# Patient Record
Sex: MALE | Race: WHITE | Employment: FULL TIME | URBAN - METROPOLITAN AREA
[De-identification: names, ages, dates, MRNs, and addresses within clinical notes are randomized per-mention and may not be internally consistent; named-entity substitution may affect disease eponyms.]

---

## 2017-04-07 ENCOUNTER — OFFICE VISIT (OUTPATIENT)
Dept: INTERNAL MEDICINE CLINIC | Age: 42
End: 2017-04-07

## 2017-04-07 VITALS
HEART RATE: 85 BPM | WEIGHT: 177.1 LBS | BODY MASS INDEX: 25.35 KG/M2 | HEIGHT: 70 IN | SYSTOLIC BLOOD PRESSURE: 120 MMHG | OXYGEN SATURATION: 97 % | TEMPERATURE: 97.5 F | RESPIRATION RATE: 16 BRPM | DIASTOLIC BLOOD PRESSURE: 70 MMHG

## 2017-04-07 DIAGNOSIS — F43.22 ADJUSTMENT DISORDER WITH ANXIETY: Primary | ICD-10-CM

## 2017-04-07 RX ORDER — CLONAZEPAM 1 MG/1
1 TABLET ORAL
Qty: 30 TAB | Refills: 2 | Status: SHIPPED | OUTPATIENT
Start: 2017-04-07 | End: 2017-07-24 | Stop reason: SDUPTHER

## 2017-04-07 NOTE — MR AVS SNAPSHOT
Visit Information Date & Time Provider Department Dept. Phone Encounter #  
 4/7/2017  8:20 AM MD [facility] [phone] [id number] Follow-up Instructions Return in about 3 months (around 7/7/2017) for Schedule Visit with Dr. Marixa Suarez / formally establish care. Upcoming Health Maintenance Date Due INFLUENZA AGE 9 TO ADULT 8/1/2016 DTaP/Tdap/Td series (2 - Td) 7/10/2022 Allergies as of 4/7/2017  Review Complete On: 4/7/2017 By: [id number], [place]  
 No Known Allergies Current Immunizations  Reviewed on 8/10/2012 Name Date Hepatitis B Vaccine 8/10/2012, 7/10/2012 TDAP Vaccine 7/10/2012 Not reviewed this visit You Were Diagnosed With   
  
 Codes Comments Adjustment disorder with anxiety    -  Primary ICD-10-CM: P01.92 
ICD-9-CM: 309.24 Vitals BP Pulse Temp Resp Height(growth percentile) Weight(growth percentile) 120/70 (BP 1 Location: Left arm, BP Patient Position: Sitting) 85 97.5 °F (36.4 °C) (Oral) 16 5' 10\" (1.778 m) 177 lb 1.6 oz (80.3 kg) SpO2 BMI Smoking Status 97% 25.41 kg/m2 Former Smoker Vitals History BMI and BSA Data Body Mass Index Body Surface Area  
 25.41 kg/m 2 1.99 m 2 Preferred Pharmacy Pharmacy Name Phone [facility] [address]. [phone] Your Updated Medication List  
  
   
This list is accurate as of: 4/7/17  8:53 AM.  Always use your most recent med list.  
  
  
  
  
 clonazePAM 1 mg tablet Commonly known as:  Stephen Alice Take 1 Tab by mouth nightly as needed. Prescriptions Printed Refills  
 clonazePAM (KLONOPIN) 1 mg tablet 2 Sig: Take 1 Tab by mouth nightly as needed. Class: Print Route: Oral  
  
Follow-up Instructions Return in about 3 months (around 7/7/2017) for Schedule Visit with Dr. Marixa Suarez / henok establish care. Patient Instructions Adjustment Disorder: Care Instructions Your Care Instructions Adjustment disorder means that you have emotional or behavioral problems because of stress. But your response to the stress is far more severe than a normal response. It is severe enough to affect your work or social life and may cause depression and physical pains and problems. Events that may cause this response can include a divorce, money problems, or starting school or a new job. It might be anything that causes some stress. This disorder is most often a short-term problem. It happens within 3 months of the stressful event or change. If the response lasts longer than 6 months after the event ends, you may have a more serious disorder. Follow-up care is a key part of your treatment and safety. Be sure to make and go to all appointments, and call your doctor if you are having problems. It's also a good idea to know your test results and keep a list of the medicines you take. How can you care for yourself at home? · Go to all counseling sessions. Do not skip any because you are feeling better. · If your doctor prescribed medicines, take them exactly as prescribed. Call your doctor if you think you are having a problem with your medicine. You will get more details on the specific medicines your doctor prescribes. · Discuss the causes of your stress with a good friend or family member. Or you can join a support group for people with similar problems. Talking to others sometimes relieves stress. · Get at least 30 minutes of exercise on most days of the week. Walking is a good choice. You also may want to do other activities, such as running, swimming, cycling, or playing tennis or team sports. Relaxation techniques Do relaxation exercises 10 to 20 minutes a day. You can play soothing, relaxing music while you do them, if you wish.  
· Tell others in your house that you are going to do your relaxation exercises. Ask them not to disturb you. · Find a comfortable, quiet place. · Lie down on your back, or sit with your back straight. · Focus on your breathing. Make it slow and steady. · Breathe in through your nose. Breathe out through either your nose or mouth. · Breathe deeply, filling up the area between your navel and your rib cage. Breathe so that your belly goes up and down. · Do not hold your breath. · Breathe like this for 5 to 10 minutes. Notice the feeling of calmness throughout your whole body. As you continue to breathe slowly and deeply, relax by doing these next steps for another 5 to 10 minutes: · Tighten and relax each muscle group in your body. Start at your toes, and work your way up to your head. · Imagine your muscle groups relaxing and getting heavy. · Empty your mind of all thoughts. · Let yourself relax more and more deeply. · Be aware of the state of calmness that surrounds you. · When your relaxation time is over, you can bring yourself back to alertness by moving your fingers and toes. Then move your hands and feet. And then move your entire body. Sometimes people fall asleep during relaxation. But they most often wake up soon. · Always give yourself time to return to full alertness before you drive a car. Wait to do anything that might cause an accident if you are not fully alert. Never play a relaxation tape while you drive a car. When should you call for help? Call 911 anytime you think you may need emergency care. For example, call if: 
· You feel you cannot stop from hurting yourself or someone else. Watch closely for changes in your health, and be sure to contact your doctor if: 
· You can't go to your counseling sessions. · You do not get better as expected. Where can you learn more? Go to http://brooks-ed.info/. Enter [id number] in the search box to learn more about \"Adjustment Disorder: Care Instructions. \" Current as of: July 26, 2016 Content Version: 11.2 © 1020-9393 [email or web address], [employer]. Care instructions adapted under license by [employer] (which disclaims liability or warranty for this information). If you have questions about a medical condition or this instruction, always ask your healthcare professional. [address] any warranty or liability for your use of this information. Introducing [facility]! Dear Hira Pascal: Thank you for requesting a [employer] account. Our records indicate that you have previously registered for a [employer] account but its currently inactive. Please call our [employer] support line at [phone]. Additional Information If you have questions, please visit the Frequently Asked Questions section of the [employer] website at [email or web address] Remember, [employer] is NOT to be used for urgent needs. For medical emergencies, dial 911. Now available from your iPhone and Android! Please provide this summary of care documentation to your next provider. Your primary care clinician is listed as [facility] [address]. If you have any questions after today's visit, please call [phone].

## 2017-04-07 NOTE — PROGRESS NOTES
HPI:  Latisha Irene is a 37y.o. year old male who returns to clinic today for routine follow up appointment to discuss the issues below:    Here for medication renewal.  This is our 2nd visit, I met him initially in 2013. Put on Klonopin approximately 8 years ago by a previous PCP in [facility] (early 35s) during time of high stress which resulted in sleeping difficulty at night. Was on 2 1/2 mg initially. Over time has cut back to 1 - 1 1/2 mg at night. Tried Zoloft remotely and recalls side effects. He now sleeps well on the Klonopin and has no daytime anxiety symptoms. He denies alcohol or substance use. [facility] reviewed - has been filled w/ instructions to take 1 mg nightly but #45 at a time. Prior to Admission medications    Medication Sig Start Date End Date Taking? Authorizing Provider   clonazePAM (KLONOPIN) 1 mg tablet Take 1 Tab by mouth nightly as needed. 10/7/16  Yes Matt Meza MD          No Known Allergies        Review of Systems   Constitutional: Negative for chills, fever and malaise/fatigue. HENT: Negative for congestion. Respiratory: Negative for cough, shortness of breath and wheezing. Cardiovascular: Negative for chest pain, palpitations and leg swelling. Gastrointestinal: Negative for abdominal pain, blood in stool and heartburn. Musculoskeletal: Negative for falls, joint pain and myalgias. Neurological: Negative for dizziness and headaches. Psychiatric/Behavioral: Negative for depression. Physical Exam   Constitutional: He appears well-developed and well-nourished. No distress. Psychiatric: He has a normal mood and affect.  His speech is normal and behavior is normal. Thought content normal. Cognition and memory are normal.         Visit Vitals    /70 (BP 1 Location: Left arm, BP Patient Position: Sitting)    Pulse 85    Temp 97.5 °F (36.4 °C) (Oral)    Resp 16    Ht 5' 10\" (1.778 m)    Wt 177 lb 1.6 oz (80.3 kg)    SpO2 97%    BMI 25.41 kg/m2         Assessment & Plan:  Maira rivera was seen today for medication evaluation. Diagnoses and all orders for this visit:    Adjustment disorder with anxiety  Discussed with patient recommendation against long term nightly benzodiazepine use. I recommend taper off of medication starting with consistently taking only 1 mg nightly. Wrote for #30 per month. Once on 1 mg consistently over the next 2 weeks, then taper to 1/2 mg nightly. Discussed need to formally establish with Dr. Ailyn Rodriguez within 3 months. (He has seen him previously for this issue and has written his script for the past year). -     clonazePAM (KLONOPIN) 1 mg tablet; Take 1 Tab by mouth nightly as needed. Follow-up Disposition:  Return in about 3 months (around 7/7/2017) for Schedule Visit with Dr. Ailyn Rodriguez / formally establish care. Advised him to call back or return to office if symptoms worsen/change/persist.  Discussed expected course/resolution/complications of diagnosis in detail with patient. Medication risks/benefits/costs/interactions/alternatives discussed with patient. He was given an after visit summary which includes diagnoses, current medications, & vitals. He expressed understanding with the diagnosis and plan.

## 2017-04-07 NOTE — PROGRESS NOTES
Chief Complaint   Patient presents with    Medication Evaluation     Reviewed record in preparation for visit and have obtained necessary documentation. Identified pt with two pt identifiers(name and ). Health Maintenance Due   Topic    INFLUENZA AGE 9 TO ADULT          Chief Complaint   Patient presents with    Medication Evaluation        Wt Readings from Last 3 Encounters:   17 177 lb 1.6 oz (80.3 kg)   10/07/16 171 lb 9.6 oz (77.8 kg)   16 168 lb (76.2 kg)     Temp Readings from Last 3 Encounters:   17 97.5 °F (36.4 °C) (Oral)   10/07/16 97.6 °F (36.4 °C) (Oral)   16 98 °F (36.7 °C) (Oral)     BP Readings from Last 3 Encounters:   17 120/70   10/07/16 110/70   16 110/60     Pulse Readings from Last 3 Encounters:   17 85   10/07/16 100   16 78           Learning Assessment:  :     Learning Assessment 2015   PRIMARY LEARNER Patient Patient   HIGHEST LEVEL OF EDUCATION - PRIMARY LEARNER  4 YEARS OF COLLEGE 4 YEARS OF COLLEGE   BARRIERS PRIMARY LEARNER NONE NONE   CO-LEARNER CAREGIVER No No   PRIMARY LANGUAGE ENGLISH ENGLISH    NEED No No   LEARNER PREFERENCE PRIMARY READING DEMONSTRATION   LEARNING SPECIAL TOPICS no no   ANSWERED BY patient patient   RELATIONSHIP SELF SELF   ASSESSMENT COMMENT none none       Depression Screening:  :     PHQ 2 / 9, over the last two weeks 2017   Little interest or pleasure in doing things Not at all   Feeling down, depressed or hopeless Not at all   Total Score PHQ 2 0       Fall Risk Assessment:  :     No flowsheet data found. Abuse Screening:  :     Abuse Screening Questionnaire 2015   Do you ever feel afraid of your partner? N N   Are you in a relationship with someone who physically or mentally threatens you? N N   Is it safe for you to go home?  Y Y       Coordination of Care Questionnaire:  :     1) Have you been to an emergency room, urgent care clinic since your last visit? no   Hospitalized since your last visit? no             2) Have you seen or consulted any other health care providers outside of [address] [place] [address] since your last visit? no  (Include any pap smears or colon screenings in this section.)    3) Do you have an Advance Directive on file? no    4) Are you interested in receiving information on Advance Directives? NO      Patient is accompanied by self I have received verbal consent from [facility] to discuss any/all medical information while they are present in the room. Reviewed record  In preparation for visit and have obtained necessary documentation.

## 2017-04-07 NOTE — PATIENT INSTRUCTIONS
Adjustment Disorder: Care Instructions  Your Care Instructions  Adjustment disorder means that you have emotional or behavioral problems because of stress. But your response to the stress is far more severe than a normal response. It is severe enough to affect your work or social life and may cause depression and physical pains and problems. Events that may cause this response can include a divorce, money problems, or starting school or a new job. It might be anything that causes some stress. This disorder is most often a short-term problem. It happens within 3 months of the stressful event or change. If the response lasts longer than 6 months after the event ends, you may have a more serious disorder. Follow-up care is a key part of your treatment and safety. Be sure to make and go to all appointments, and call your doctor if you are having problems. It's also a good idea to know your test results and keep a list of the medicines you take. How can you care for yourself at home? · Go to all counseling sessions. Do not skip any because you are feeling better. · If your doctor prescribed medicines, take them exactly as prescribed. Call your doctor if you think you are having a problem with your medicine. You will get more details on the specific medicines your doctor prescribes. · Discuss the causes of your stress with a good friend or family member. Or you can join a support group for people with similar problems. Talking to others sometimes relieves stress. · Get at least 30 minutes of exercise on most days of the week. Walking is a good choice. You also may want to do other activities, such as running, swimming, cycling, or playing tennis or team sports. Relaxation techniques  Do relaxation exercises 10 to 20 minutes a day. You can play soothing, relaxing music while you do them, if you wish. · Tell others in your house that you are going to do your relaxation exercises.  Ask them not to disturb you.  · Find a comfortable, quiet place. · Lie down on your back, or sit with your back straight. · Focus on your breathing. Make it slow and steady. · Breathe in through your nose. Breathe out through either your nose or mouth. · Breathe deeply, filling up the area between your navel and your rib cage. Breathe so that your belly goes up and down. · Do not hold your breath. · Breathe like this for 5 to 10 minutes. Notice the feeling of calmness throughout your whole body. As you continue to breathe slowly and deeply, relax by doing these next steps for another 5 to 10 minutes:  · Tighten and relax each muscle group in your body. Start at your toes, and work your way up to your head. · Imagine your muscle groups relaxing and getting heavy. · Empty your mind of all thoughts. · Let yourself relax more and more deeply. · Be aware of the state of calmness that surrounds you. · When your relaxation time is over, you can bring yourself back to alertness by moving your fingers and toes. Then move your hands and feet. And then move your entire body. Sometimes people fall asleep during relaxation. But they most often wake up soon. · Always give yourself time to return to full alertness before you drive a car. Wait to do anything that might cause an accident if you are not fully alert. Never play a relaxation tape while you drive a car. When should you call for help? Call 911 anytime you think you may need emergency care. For example, call if:  · You feel you cannot stop from hurting yourself or someone else. Watch closely for changes in your health, and be sure to contact your doctor if:  · You can't go to your counseling sessions. · You do not get better as expected. Where can you learn more? Go to http://brooks-ed.info/. Enter [id number] in the search box to learn more about \"Adjustment Disorder: Care Instructions. \"  Current as of: July 26, 2016  Content Version: 11.2  © 6958-7620 [employer]. Care instructions adapted under license by [employer] (which disclaims liability or warranty for this information). If you have questions about a medical condition or this instruction, always ask your healthcare professional. [address] any warranty or liability for your use of this information.

## 2017-07-24 ENCOUNTER — OFFICE VISIT (OUTPATIENT)
Dept: INTERNAL MEDICINE CLINIC | Age: 42
End: 2017-07-24

## 2017-07-24 VITALS
WEIGHT: 181.5 LBS | TEMPERATURE: 97.3 F | RESPIRATION RATE: 14 BRPM | HEART RATE: 77 BPM | SYSTOLIC BLOOD PRESSURE: 110 MMHG | OXYGEN SATURATION: 99 % | HEIGHT: 70 IN | BODY MASS INDEX: 25.98 KG/M2 | DIASTOLIC BLOOD PRESSURE: 80 MMHG

## 2017-07-24 DIAGNOSIS — Z00.00 WELL ADULT EXAM: Primary | ICD-10-CM

## 2017-07-24 DIAGNOSIS — F43.22 ADJUSTMENT DISORDER WITH ANXIETY: ICD-10-CM

## 2017-07-24 RX ORDER — CLONAZEPAM 0.5 MG/1
TABLET ORAL
Qty: 50 TAB | Refills: 0 | Status: SHIPPED | OUTPATIENT
Start: 2017-07-24 | End: 2017-09-11 | Stop reason: SDUPTHER

## 2017-07-24 NOTE — PATIENT INSTRUCTIONS
Follow a Mediterranean style diet. Try to taper slowly off of Klonopin. Have fasting blood work analysis. Learning About the [address] Diet  What is the Mediterranean diet? The Mediterranean diet is a style of eating rather than a diet plan. It features foods eaten in [place], [place], [place] and [place], and other countries along the [facility]. It emphasizes eating foods like fish, fruits, vegetables, beans, high-fiber breads and whole grains, nuts, and olive oil. This style of eating includes limited red meat, cheese, and sweets. Why choose the Mediterranean diet? A Mediterranean-style diet may improve heart health. It contains more fat than other heart-healthy diets. But the fats are mainly from nuts, unsaturated oils (such as fish oils and olive oil), and certain nut or seed oils (such as canola, soybean, or flaxseed oil). These fats may help protect the heart and blood vessels. How can you get started on the Mediterranean diet? Here are some things you can do to switch to a more Mediterranean way of eating. What to eat  · Eat a variety of fruits and vegetables each day, such as grapes, blueberries, tomatoes, broccoli, peppers, figs, olives, spinach, eggplant, beans, lentils, and chickpeas. · Eat a variety of whole-grain foods each day, such as oats, brown rice, and whole wheat bread, pasta, and couscous. · Eat fish at least 2 times a week. Try tuna, salmon, mackerel, lake trout, herring, or sardines. · Eat moderate amounts of low-fat dairy products, such as milk, cheese, or yogurt. · Eat moderate amounts of poultry and eggs. · Choose healthy (unsaturated) fats, such as nuts, olive oil, and certain nut or seed oils like canola, soybean, and flaxseed. · Limit unhealthy (saturated) fats, such as butter, palm oil, and coconut oil. And limit fats found in animal products, such as meat and dairy products made with whole milk.  Try to eat red meat only a few times a month in very small amounts. · Limit sweets and desserts to only a few times a week. This includes sugar-sweetened drinks like soda. The Mediterranean diet may also include red wine with your meal--1 glass each day for women and up to 2 glasses a day for men. Tips for eating at home  · Use herbs, spices, garlic, lemon zest, and citrus juice instead of salt to add flavor to foods. · Add avocado slices to your sandwich instead of miranda. · Have fish for lunch or dinner instead of red meat. Brush the fish with olive oil, and broil or grill it. · Sprinkle your salad with seeds or nuts instead of cheese. · Cook with olive or canola oil instead of butter or oils that are high in saturated fat. · Switch from 2% milk or whole milk to 1% or fat-free milk. · Dip raw vegetables in a vinaigrette dressing or hummus instead of dips made from mayonnaise or sour cream.  · Have a piece of fruit for dessert instead of a piece of cake. Try baked apples, or have some dried fruit. Tips for eating out  · Try broiled, grilled, baked, or poached fish instead of having it fried or breaded. · Ask your  to have your meals prepared with olive oil instead of butter. · Order dishes made with marinara sauce or sauces made from olive oil. Avoid sauces made from cream or mayonnaise. · Choose whole-grain breads, whole wheat pasta and pizza crust, brown rice, beans, and lentils. · Cut back on butter or margarine on bread. Instead, you can dip your bread in a small amount of olive oil. · Ask for a side salad or grilled vegetables instead of french fries or chips. Where can you learn more? Go to http://brooks-ed.info/. Enter [phone] in the search box to learn more about \"Learning About the Mediterranean Diet. \"  Current as of: December 29, 2016  Content Version: 11.3  © 3074-1288 [employer], [employer].  Care instructions adapted under license by [employer] (which disclaims liability or warranty for this information). If you have questions about a medical condition or this instruction, always ask your healthcare professional. [place] [address] any warranty or liability for your use of this information.

## 2017-07-24 NOTE — PROGRESS NOTES
Subjective:     Chief Complaint   Patient presents with    Medication Evaluation     He  is a 37y.o. year old male who presents for evaluation. Job is stressful. Has two kids. Has been on clonazepam for 15 years. Has had some night terrors since scaling it back. Historical Data    No past medical history on file. No past surgical history on file. Outpatient Encounter Prescriptions as of 7/24/2017   Medication Sig Dispense Refill    clonazePAM (KLONOPIN) 1 mg tablet Take 1 Tab by mouth nightly as needed. 30 Tab 2     No facility-administered encounter medications on file as of 7/24/2017. No Known Allergies     Social History     Social History    Marital status:      Spouse name: N/A    Number of children: N/A    Years of education: N/A     Occupational History    Not on file. Social History Main Topics    Smoking status: Former Smoker     Packs/day: 0.25     Years: 15.00    Smokeless tobacco: Never Used    Alcohol use No    Drug use: No    Sexual activity: Yes     Partners: Female     Other Topics Concern    Not on file     Social History Narrative        Review of Systems  A comprehensive review of systems was negative except for that written in the HPI. Objective:     Vitals:    07/24/17 0804   BP: 110/80   Pulse: 77   Resp: 14   Temp: 97.3 °F (36.3 °C)   SpO2: 99%   Weight: 181 lb 8 oz (82.3 kg)   Height: 5' 10\" (1.778 m)     Pleasant WM in no acute distress. Neck: Supple. Cardiac: RRR without murmurs gallops or rubs. Lungs: Clear to ausculation. ASSESSMENT / PLAN:   1. Well adult exam  · Mediterranean diet  - LIPID PANEL; Future  - METABOLIC PANEL, BASIC; Future    2. Adjustment disorder with anxiety  · Advised discontinuation of chronic benzodiazepine use due to cognition risks. · Drop to 0.5 mg tablets and taper as able. - clonazePAM (KLONOPIN) 0.5 mg tablet; Use 2 tablets or less at bedtime. Dispense: 50 Tab;  Refill: 0    Patient Instructions Follow a Mediterranean style diet. Try to taper slowly off of Klonopin. Have fasting blood work analysis. Learning About the [address] Diet  What is the Mediterranean diet? The Mediterranean diet is a style of eating rather than a diet plan. It features foods eaten in [place], [place], [place] and [place], and other countries along the [facility]. It emphasizes eating foods like fish, fruits, vegetables, beans, high-fiber breads and whole grains, nuts, and olive oil. This style of eating includes limited red meat, cheese, and sweets. Why choose the Mediterranean diet? A Mediterranean-style diet may improve heart health. It contains more fat than other heart-healthy diets. But the fats are mainly from nuts, unsaturated oils (such as fish oils and olive oil), and certain nut or seed oils (such as canola, soybean, or flaxseed oil). These fats may help protect the heart and blood vessels. How can you get started on the Mediterranean diet? Here are some things you can do to switch to a more Mediterranean way of eating. What to eat  · Eat a variety of fruits and vegetables each day, such as grapes, blueberries, tomatoes, broccoli, peppers, figs, olives, spinach, eggplant, beans, lentils, and chickpeas. · Eat a variety of whole-grain foods each day, such as oats, brown rice, and whole wheat bread, pasta, and couscous. · Eat fish at least 2 times a week. Try tuna, salmon, mackerel, lake trout, herring, or sardines. · Eat moderate amounts of low-fat dairy products, such as milk, cheese, or yogurt. · Eat moderate amounts of poultry and eggs. · Choose healthy (unsaturated) fats, such as nuts, olive oil, and certain nut or seed oils like canola, soybean, and flaxseed. · Limit unhealthy (saturated) fats, such as butter, palm oil, and coconut oil. And limit fats found in animal products, such as meat and dairy products made with whole milk.  Try to eat red meat only a few times a month in very small amounts. · Limit sweets and desserts to only a few times a week. This includes sugar-sweetened drinks like soda. The Mediterranean diet may also include red wine with your meal--1 glass each day for women and up to 2 glasses a day for men. Tips for eating at home  · Use herbs, spices, garlic, lemon zest, and citrus juice instead of salt to add flavor to foods. · Add avocado slices to your sandwich instead of miranda. · Have fish for lunch or dinner instead of red meat. Brush the fish with olive oil, and broil or grill it. · Sprinkle your salad with seeds or nuts instead of cheese. · Cook with olive or canola oil instead of butter or oils that are high in saturated fat. · Switch from 2% milk or whole milk to 1% or fat-free milk. · Dip raw vegetables in a vinaigrette dressing or hummus instead of dips made from mayonnaise or sour cream.  · Have a piece of fruit for dessert instead of a piece of cake. Try baked apples, or have some dried fruit. Tips for eating out  · Try broiled, grilled, baked, or poached fish instead of having it fried or breaded. · Ask your  to have your meals prepared with olive oil instead of butter. · Order dishes made with marinara sauce or sauces made from olive oil. Avoid sauces made from cream or mayonnaise. · Choose whole-grain breads, whole wheat pasta and pizza crust, brown rice, beans, and lentils. · Cut back on butter or margarine on bread. Instead, you can dip your bread in a small amount of olive oil. · Ask for a side salad or grilled vegetables instead of french fries or chips. Where can you learn more? Go to http://brooks-ed.info/. Enter 368-535-2331 in the search box to learn more about \"Learning About the Mediterranean Diet. \"  Current as of: December 29, 2016  Content Version: 11.3  © 8869-7477 [employer]  Care instructions adapted under license by [employer] (which disclaims liability or warranty for this information). If you have questions about a medical condition or this instruction, always ask your healthcare professional. [place] [address] any warranty or liability for your use of this information. Follow-up Disposition:  Return in about 6 months (around 1/24/2018) for F/U anxiety. Advised him to call back or return to office if symptoms worsen/change/persist.  Discussed expected course/resolution/complications of diagnosis in detail with patient. Medication risks/benefits/costs/interactions/alternatives discussed with patient. He was given an after visit summary which includes diagnoses, current medications, & vitals. He expressed understanding with the diagnosis and plan.

## 2017-07-24 NOTE — MR AVS SNAPSHOT
Visit Information Date & Time Provider Department Dept. Phone Encounter #  
 7/24/2017  8:00 AM Jennifer Duvall MD [place] [id number] [facility] [phone] [id number] Follow-up Instructions Return in about 6 months (around 1/24/2018) for F/U anxiety. Upcoming Health Maintenance Date Due INFLUENZA AGE 9 TO ADULT 8/1/2017 DTaP/Tdap/Td series (2 - Td) 7/10/2022 Allergies as of 7/24/2017  Review Complete On: 7/24/2017 By: Jennifer Duvall MD  
 No Known Allergies Current Immunizations  Reviewed on 8/10/2012 Name Date Hepatitis B Vaccine 8/10/2012, 7/10/2012 TDAP Vaccine 7/10/2012 Not reviewed this visit You Were Diagnosed With   
  
 Codes Comments Well adult exam    -  Primary ICD-10-CM: Z00.00 ICD-9-CM: V70.0 Adjustment disorder with anxiety     ICD-10-CM: F43.22 
ICD-9-CM: 309.24 Vitals BP Pulse Temp Resp Height(growth percentile) Weight(growth percentile) 110/80 (BP 1 Location: Left arm, BP Patient Position: Sitting) 77 97.3 °F (36.3 °C) 14 5' 10\" (1.778 m) 181 lb 8 oz (82.3 kg) SpO2 BMI Smoking Status 99% 26.04 kg/m2 Former Smoker BMI and BSA Data Body Mass Index Body Surface Area 26.04 kg/m 2 2.02 m 2 Preferred Pharmacy Pharmacy Name Phone [address] [place], [address] [place] [address]. [phone] Your Updated Medication List  
  
   
This list is accurate as of: 7/24/17  8:21 AM.  Always use your most recent med list.  
  
  
  
  
 clonazePAM 0.5 mg tablet Commonly known as:  Dala Pleasure Use 2 tablets or less at bedtime. Prescriptions Printed Refills  
 clonazePAM (KLONOPIN) 0.5 mg tablet 0 Sig: Use 2 tablets or less at bedtime. Class: Print Follow-up Instructions Return in about 6 months (around 1/24/2018) for F/U anxiety. To-Do List   
 07/25/2017 Lab:  LIPID PANEL   
  
 07/25/2017 Lab: METABOLIC PANEL, BASIC Patient Instructions Follow a Mediterranean style diet. Try to taper slowly off of Klonopin. Have fasting blood work analysis. Learning About the [address] Diet What is the Mediterranean diet? The Mediterranean diet is a style of eating rather than a diet plan. It features foods eaten in [place], [place], [place] and [place], and other countries along the [facility]. It emphasizes eating foods like fish, fruits, vegetables, beans, high-fiber breads and whole grains, nuts, and olive oil. This style of eating includes limited red meat, cheese, and sweets. Why choose the Mediterranean diet? A Mediterranean-style diet may improve heart health. It contains more fat than other heart-healthy diets. But the fats are mainly from nuts, unsaturated oils (such as fish oils and olive oil), and certain nut or seed oils (such as canola, soybean, or flaxseed oil). These fats may help protect the heart and blood vessels. How can you get started on the Mediterranean diet? Here are some things you can do to switch to a more Mediterranean way of eating. What to eat · Eat a variety of fruits and vegetables each day, such as grapes, blueberries, tomatoes, broccoli, peppers, figs, olives, spinach, eggplant, beans, lentils, and chickpeas. · Eat a variety of whole-grain foods each day, such as oats, brown rice, and whole wheat bread, pasta, and couscous. · Eat fish at least 2 times a week. Try tuna, salmon, mackerel, lake trout, herring, or sardines. · Eat moderate amounts of low-fat dairy products, such as milk, cheese, or yogurt. · Eat moderate amounts of poultry and eggs. · Choose healthy (unsaturated) fats, such as nuts, olive oil, and certain nut or seed oils like canola, soybean, and flaxseed. · Limit unhealthy (saturated) fats, such as butter, palm oil, and coconut oil.  And limit fats found in animal products, such as meat and dairy products made with whole milk. Try to eat red meat only a few times a month in very small amounts. · Limit sweets and desserts to only a few times a week. This includes sugar-sweetened drinks like soda. The Mediterranean diet may also include red wine with your meal1 glass each day for women and up to 2 glasses a day for men. Tips for eating at home · Use herbs, spices, garlic, lemon zest, and citrus juice instead of salt to add flavor to foods. · Add avocado slices to your sandwich instead of miranda. · Have fish for lunch or dinner instead of red meat. Brush the fish with olive oil, and broil or grill it. · Sprinkle your salad with seeds or nuts instead of cheese. · Cook with olive or canola oil instead of butter or oils that are high in saturated fat. · Switch from 2% milk or whole milk to 1% or fat-free milk. · Dip raw vegetables in a vinaigrette dressing or hummus instead of dips made from mayonnaise or sour cream. 
· Have a piece of fruit for dessert instead of a piece of cake. Try baked apples, or have some dried fruit. Tips for eating out · Try broiled, grilled, baked, or poached fish instead of having it fried or breaded. · Ask your  to have your meals prepared with olive oil instead of butter. · Order dishes made with marinara sauce or sauces made from olive oil. Avoid sauces made from cream or mayonnaise. · Choose whole-grain breads, whole wheat pasta and pizza crust, brown rice, beans, and lentils. · Cut back on butter or margarine on bread. Instead, you can dip your bread in a small amount of olive oil. · Ask for a side salad or grilled vegetables instead of french fries or chips. Where can you learn more? Go to http://brooks-ed.info/. Enter [phone] in the search box to learn more about \"Learning About the Mediterranean Diet. \" Current as of: December 29, 2016 Content Version: 11.3 © 3785-9050 [employer].  Care instructions adapted under license by [address] (which disclaims liability or warranty for this information). If you have questions about a medical condition or this instruction, always ask your healthcare professional. [address] any warranty or liability for your use of this information. Introducing [facility]! Rusty Green introduces [employer] patient portal. Now you can access parts of your medical record, email your doctor's office, and request medication refills online. 1. In your internet browser, go to [email or web address] [email or web address] 2. Click on the First Time User? Click Here link in the Sign In box. You will see the New Member Sign Up page. 3. Enter your [employer] Access Code exactly as it appears below. You will not need to use this code after youve completed the sign-up process. If you do not sign up before the expiration date, you must request a new code. · [employer] Access Code: [email or web address] Expires: 10/22/2017  8:21 AM 
 
4. Enter the last four digits of your Social Security Number (xxxx) and Date of Birth (mm/dd/yyyy) as indicated and click Submit. You will be taken to the next sign-up page. 5. Create a [employer] ID. This will be your [employer] login ID and cannot be changed, so think of one that is secure and easy to remember. 6. Create a [employer] password. You can change your password at any time. 7. Enter your Password Reset Question and Answer. This can be used at a later time if you forget your password. 8. Enter your e-mail address. You will receive e-mail notification when new information is available in [address] 9. Click Sign Up. You can now view and download portions of your medical record. 10. Click the Download Summary menu link to download a portable copy of your medical information. If you have questions, please visit the Frequently Asked Questions section of the [employer] website.  Remember, [employer] is NOT to be used for urgent needs. For medical emergencies, dial 911. Now available from your iPhone and Android! Please provide this summary of care documentation to your next provider. Your primary care clinician is listed as Claudia Campos. If you have any questions after today's visit, please call [phone].

## 2017-07-24 NOTE — PROGRESS NOTES
Chief Complaint   Patient presents with    Medication Evaluation     Reviewed record in preparation for visit and have obtained necessary documentation. Identified pt with two pt identifiers(name and ). There are no preventive care reminders to display for this patient. Chief Complaint   Patient presents with    Medication Evaluation        Wt Readings from Last 3 Encounters:   17 181 lb 8 oz (82.3 kg)   17 177 lb 1.6 oz (80.3 kg)   10/07/16 171 lb 9.6 oz (77.8 kg)     Temp Readings from Last 3 Encounters:   17 97.3 °F (36.3 °C)   17 97.5 °F (36.4 °C) (Oral)   10/07/16 97.6 °F (36.4 °C) (Oral)     BP Readings from Last 3 Encounters:   17 110/80   17 120/70   10/07/16 110/70     Pulse Readings from Last 3 Encounters:   17 77   17 85   10/07/16 100           Learning Assessment:  :     Learning Assessment 2017   PRIMARY LEARNER Patient Patient Patient   HIGHEST LEVEL OF EDUCATION - PRIMARY LEARNER  4 YEARS OF COLLEGE 4 YEARS OF COLLEGE 4 YEARS OF COLLEGE   BARRIERS PRIMARY LEARNER NONE NONE NONE   CO-LEARNER CAREGIVER No No No   PRIMARY LANGUAGE ENGLISH ENGLISH ENGLISH    NEED - No No   LEARNER PREFERENCE PRIMARY READING READING DEMONSTRATION   LEARNING SPECIAL TOPICS - no no   ANSWERED BY patient patient patient   RELATIONSHIP SELF SELF SELF   ASSESSMENT COMMENT - none none       Depression Screening:  :     PHQ over the last two weeks 2017   Little interest or pleasure in doing things Not at all   Feeling down, depressed or hopeless Not at all   Total Score PHQ 2 0       Fall Risk Assessment:  :     No flowsheet data found. Abuse Screening:  :     Abuse Screening Questionnaire 2017   Do you ever feel afraid of your partner? N N N   Are you in a relationship with someone who physically or mentally threatens you? N N N   Is it safe for you to go home?  Doug Henderson       Coordination of Care Questionnaire:  :     1) Have you been to an emergency room, urgent care clinic since your last visit? no   Hospitalized since your last visit? no             2) Have you seen or consulted any other health care providers outside of [address] [place] [address] since your last visit? no  (Include any pap smears or colon screenings in this section.)    3) Do you have an Advance Directive on file? no    4) Are you interested in receiving information on Advance Directives? NO      Patient is accompanied by self I have received verbal consent from [facility] to discuss any/all medical information while they are present in the room. Reviewed record  In preparation for visit and have obtained necessary documentation.

## 2017-07-27 ENCOUNTER — LAB ONLY (OUTPATIENT)
Dept: INTERNAL MEDICINE CLINIC | Age: 42
End: 2017-07-27

## 2017-07-27 DIAGNOSIS — Z00.00 WELL ADULT EXAM: ICD-10-CM

## 2017-07-28 LAB
BUN SERPL-MCNC: 16 MG/DL (ref 6–24)
BUN/CREAT SERPL: 20 (ref 9–20)
CALCIUM SERPL-MCNC: 9 MG/DL (ref 8.7–10.2)
CHLORIDE SERPL-SCNC: 99 MMOL/L (ref 96–106)
CHOLEST SERPL-MCNC: 191 MG/DL (ref 100–199)
CO2 SERPL-SCNC: 25 MMOL/L (ref 18–29)
CREAT SERPL-MCNC: 0.82 MG/DL (ref 0.76–1.27)
GLUCOSE SERPL-MCNC: 83 MG/DL (ref 65–99)
HDLC SERPL-MCNC: 36 MG/DL
INTERPRETATION, 910389: NORMAL
LDLC SERPL CALC-MCNC: 95 MG/DL (ref 0–99)
POTASSIUM SERPL-SCNC: 4.5 MMOL/L (ref 3.5–5.2)
SODIUM SERPL-SCNC: 140 MMOL/L (ref 134–144)
TRIGL SERPL-MCNC: 299 MG/DL (ref 0–149)
VLDLC SERPL CALC-MCNC: 60 MG/DL (ref 5–40)

## 2017-09-11 DIAGNOSIS — F43.22 ADJUSTMENT DISORDER WITH ANXIETY: ICD-10-CM

## 2017-09-11 RX ORDER — CLONAZEPAM 0.5 MG/1
TABLET ORAL
Qty: 50 TAB | Refills: 0 | OUTPATIENT
Start: 2017-09-11 | End: 2017-11-17 | Stop reason: SDUPTHER

## 2017-09-11 NOTE — TELEPHONE ENCOUNTER
Requested Prescriptions     Pending Prescriptions Disp Refills    clonazePAM (KLONOPIN) 0.5 mg tablet 50 Tab 0     Sig: Use 2 tablets or less at bedtime.       Last OV:07/24/17           Pharmacy: Nika [facility] [address] [place] [address]

## 2017-11-17 DIAGNOSIS — F43.22 ADJUSTMENT DISORDER WITH ANXIETY: ICD-10-CM

## 2017-11-17 RX ORDER — CLONAZEPAM 0.5 MG/1
TABLET ORAL
Qty: 50 TAB | Refills: 0 | Status: SHIPPED | OUTPATIENT
Start: 2017-11-17 | End: 2018-01-25 | Stop reason: SDUPTHER

## 2017-11-17 NOTE — TELEPHONE ENCOUNTER
Requested Prescriptions     Pending Prescriptions Disp Refills    clonazePAM (KLONOPIN) 0.5 mg tablet 50 Tab 0     Sig: Use 2 tablets or less at bedtime.      Last 07/24/2017  No upcoming appointment.    germaine on file

## 2017-11-17 NOTE — TELEPHONE ENCOUNTER
Patient prescription clonazepam 0.5 mg phoned into [facility] [phone].

## 2018-01-25 DIAGNOSIS — F43.22 ADJUSTMENT DISORDER WITH ANXIETY: ICD-10-CM

## 2018-01-25 NOTE — TELEPHONE ENCOUNTER
Requested Prescriptions     Pending Prescriptions Disp Refills    clonazePAM (KLONOPIN) 0.5 mg tablet 50 Tab 0     Sig: Use 2 tablets or less at bedtime.      Last OV:07/24/17           Pharmacy: [facility] [address]

## 2018-01-26 RX ORDER — CLONAZEPAM 0.5 MG/1
TABLET ORAL
Qty: 50 TAB | Refills: 0 | Status: SHIPPED | OUTPATIENT
Start: 2018-01-26 | End: 2018-03-22 | Stop reason: SDUPTHER

## 2018-03-22 DIAGNOSIS — F43.22 ADJUSTMENT DISORDER WITH ANXIETY: ICD-10-CM

## 2018-03-22 RX ORDER — CLONAZEPAM 0.5 MG/1
TABLET ORAL
Qty: 50 TAB | Refills: 0 | Status: SHIPPED | OUTPATIENT
Start: 2018-03-22 | End: 2018-05-25 | Stop reason: SDUPTHER

## 2018-03-22 NOTE — TELEPHONE ENCOUNTER
Requested Prescriptions     Pending Prescriptions Disp Refills    clonazePAM (KLONOPIN) 0.5 mg tablet 50 Tab 0     Sig: Use 2 tablets or less at bedtime.      Last OV: 7/24/18  Next OV: NONE

## 2018-03-22 NOTE — TELEPHONE ENCOUNTER
Received printed Rx for Klonopin from Dr. Denys Henderson. Rx faxed to [facility] pharmacy listed in chart and confirmation received. Rx was shredded/voided.

## 2018-05-25 DIAGNOSIS — F43.22 ADJUSTMENT DISORDER WITH ANXIETY: ICD-10-CM

## 2018-05-25 RX ORDER — CLONAZEPAM 0.5 MG/1
TABLET ORAL
Qty: 50 TAB | Refills: 0 | Status: SHIPPED | OUTPATIENT
Start: 2018-05-25 | End: 2018-08-06 | Stop reason: SDUPTHER

## 2018-05-25 NOTE — TELEPHONE ENCOUNTER
Requested Prescriptions     Pending Prescriptions Disp Refills    clonazePAM (KLONOPIN) 0.5 mg tablet 50 Tab 0     Sig: Use 2 tablets or less at bedtime.      07/24/2017   no upcoming

## 2018-07-16 ENCOUNTER — OFFICE VISIT (OUTPATIENT)
Dept: INTERNAL MEDICINE CLINIC | Age: 43
End: 2018-07-16

## 2018-07-16 VITALS
TEMPERATURE: 98 F | SYSTOLIC BLOOD PRESSURE: 138 MMHG | BODY MASS INDEX: 25.04 KG/M2 | WEIGHT: 174.9 LBS | HEART RATE: 84 BPM | OXYGEN SATURATION: 98 % | HEIGHT: 70 IN | RESPIRATION RATE: 14 BRPM | DIASTOLIC BLOOD PRESSURE: 80 MMHG

## 2018-07-16 DIAGNOSIS — B07.0 PLANTAR WART OF LEFT FOOT: Primary | ICD-10-CM

## 2018-07-16 NOTE — PROGRESS NOTES
Chief Complaint   Patient presents with    Foreign Body Removal     left foot     Reviewed record in preparation for visit and have obtained necessary documentation. Identified pt with two pt identifiers(name and ). There are no preventive care reminders to display for this patient. Chief Complaint   Patient presents with    Foreign Body Removal     left foot        Wt Readings from Last 3 Encounters:   18 174 lb 14.4 oz (79.3 kg)   17 181 lb 8 oz (82.3 kg)   17 177 lb 1.6 oz (80.3 kg)     Temp Readings from Last 3 Encounters:   18 98 °F (36.7 °C) (Oral)   17 97.3 °F (36.3 °C)   17 97.5 °F (36.4 °C) (Oral)     BP Readings from Last 3 Encounters:   18 138/80   17 110/80   17 120/70     Pulse Readings from Last 3 Encounters:   18 84   17 77   17 85           Learning Assessment:  :     Learning Assessment 2017   PRIMARY LEARNER Patient Patient Patient   HIGHEST LEVEL OF EDUCATION - PRIMARY LEARNER  4 YEARS OF COLLEGE 4 YEARS OF COLLEGE 4 YEARS OF COLLEGE   BARRIERS PRIMARY LEARNER NONE NONE NONE   CO-LEARNER CAREGIVER No No No   PRIMARY LANGUAGE ENGLISH ENGLISH ENGLISH    NEED - No No   LEARNER PREFERENCE PRIMARY READING READING DEMONSTRATION   LEARNING SPECIAL TOPICS - no no   ANSWERED BY patient patient patient   RELATIONSHIP SELF SELF SELF   ASSESSMENT COMMENT - none none       Depression Screening:  :     PHQ over the last two weeks 2018   Little interest or pleasure in doing things Not at all   Feeling down, depressed or hopeless Not at all   Total Score PHQ 2 0       Fall Risk Assessment:  :     No flowsheet data found. Abuse Screening:  :     Abuse Screening Questionnaire 2017   Do you ever feel afraid of your partner? N N N   Are you in a relationship with someone who physically or mentally threatens you? N N N   Is it safe for you to go home?  Becky Anthony Coordination of Care Questionnaire:  :     1) Have you been to an emergency room, urgent care clinic since your last visit? no   Hospitalized since your last visit? no             2) Have you seen or consulted any other health care providers outside of [address] [place] [address] since your last visit? no  (Include any pap smears or colon screenings in this section.)    3) Do you have an Advance Directive on file? no    4) Are you interested in receiving information on Advance Directives? NO      Patient is accompanied by self I have received verbal consent from [facility] to discuss any/all medical information while they are present in the room. Reviewed record  In preparation for visit and have obtained necessary documentation.

## 2018-07-16 NOTE — PROGRESS NOTES
Subjective:     Chief Complaint   Patient presents with    Foreign Body Removal     left foot     He  is a 40y.o. year old male who presents for evaluation. Patient pulled off some skin from bottom of foot but now notes a sore area. He cut into it a bit but didn't find a FB, etc.      Historical Data    No past medical history on file. No past surgical history on file. Outpatient Encounter Prescriptions as of 7/16/2018   Medication Sig Dispense Refill    clonazePAM (KLONOPIN) 0.5 mg tablet Use 2 tablets or less at bedtime. 50 Tab 0     No facility-administered encounter medications on file as of 7/16/2018. No Known Allergies     Social History     Social History    Marital status:      Spouse name: N/A    Number of children: N/A    Years of education: N/A     Occupational History    Not on file. Social History Main Topics    Smoking status: Former Smoker     Packs/day: 0.25     Years: 15.00    Smokeless tobacco: Never Used    Alcohol use No    Drug use: No    Sexual activity: Yes     Partners: Female     Other Topics Concern    Not on file     Social History Narrative        Review of Systems  Pertinent items are noted in HPI. Objective:     Vitals:    07/16/18 1457   BP: 138/80   Pulse: 84   Resp: 14   Temp: 98 °F (36.7 °C)   TempSrc: Oral   SpO2: 98%   Weight: 174 lb 14.4 oz (79.3 kg)   Height: 5' 10\" (1.778 m)     Pleasant WM. Left foot:  Firm SC mass under skin on sole of foot. Plantar wart versus FB.    ASSESSMENT / PLAN:   1. Plantar wart of left foot  · Needs further assessment by Podiatry.  - REFERRAL TO PODIATRY             Follow-up Disposition:  Return if symptoms worsen or fail to improve. Advised him to call back or return to office if symptoms worsen/change/persist.  Discussed expected course/resolution/complications of diagnosis in detail with patient. Medication risks/benefits/costs/interactions/alternatives discussed with patient.   He was given an after visit summary which includes diagnoses, current medications, & vitals. He expressed understanding with the diagnosis and plan.

## 2018-08-06 DIAGNOSIS — F43.22 ADJUSTMENT DISORDER WITH ANXIETY: ICD-10-CM

## 2018-08-06 RX ORDER — CLONAZEPAM 0.5 MG/1
TABLET ORAL
Qty: 50 TAB | Refills: 0 | Status: SHIPPED | OUTPATIENT
Start: 2018-08-06 | End: 2018-10-10 | Stop reason: SDUPTHER

## 2018-08-06 NOTE — TELEPHONE ENCOUNTER
Requested Prescriptions     Pending Prescriptions Disp Refills    clonazePAM (KLONOPIN) 0.5 mg tablet 50 Tab 0     Sig: Use 2 tablets or less at bedtime.      07/16/2018   No upcoming    [facility] on file

## 2018-10-10 DIAGNOSIS — F43.22 ADJUSTMENT DISORDER WITH ANXIETY: ICD-10-CM

## 2018-10-10 RX ORDER — CLONAZEPAM 0.5 MG/1
TABLET ORAL
Qty: 50 TAB | Refills: 0 | Status: SHIPPED | OUTPATIENT
Start: 2018-10-10 | End: 2018-11-21 | Stop reason: SDUPTHER

## 2018-10-10 NOTE — TELEPHONE ENCOUNTER
Requested Prescriptions     Pending Prescriptions Disp Refills    clonazePAM (KLONOPIN) 0.5 mg tablet 50 Tab 0     Sig: Use 2 tablets or less at bedtime. 07/24/18  10/17/18    Pharmacy: [facility] [address] [facility], [address] [place] [address] RD.

## 2018-11-21 ENCOUNTER — OFFICE VISIT (OUTPATIENT)
Dept: INTERNAL MEDICINE CLINIC | Age: 43
End: 2018-11-21

## 2018-11-21 VITALS
SYSTOLIC BLOOD PRESSURE: 120 MMHG | BODY MASS INDEX: 24.45 KG/M2 | DIASTOLIC BLOOD PRESSURE: 70 MMHG | OXYGEN SATURATION: 98 % | HEART RATE: 76 BPM | RESPIRATION RATE: 14 BRPM | TEMPERATURE: 97.9 F | HEIGHT: 70 IN | WEIGHT: 170.8 LBS

## 2018-11-21 DIAGNOSIS — F43.22 ADJUSTMENT DISORDER WITH ANXIETY: ICD-10-CM

## 2018-11-21 DIAGNOSIS — Z00.00 ROUTINE GENERAL MEDICAL EXAMINATION AT A HEALTH CARE FACILITY: Primary | ICD-10-CM

## 2018-11-21 RX ORDER — CLONAZEPAM 0.5 MG/1
TABLET ORAL
Qty: 50 TAB | Refills: 0 | Status: SHIPPED | OUTPATIENT
Start: 2018-11-21 | End: 2019-01-30 | Stop reason: SDUPTHER

## 2018-11-21 NOTE — PROGRESS NOTES
Chief Complaint   Patient presents with    Complete Physical     Reviewed record in preparation for visit and have obtained necessary documentation. Identified pt with two pt identifiers(name and ). There are no preventive care reminders to display for this patient. Chief Complaint   Patient presents with    Complete Physical        Wt Readings from Last 3 Encounters:   18 170 lb 12.8 oz (77.5 kg)   18 174 lb 14.4 oz (79.3 kg)   17 181 lb 8 oz (82.3 kg)     Temp Readings from Last 3 Encounters:   18 97.9 °F (36.6 °C) (Oral)   18 98 °F (36.7 °C) (Oral)   17 97.3 °F (36.3 °C)     BP Readings from Last 3 Encounters:   18 120/70   18 138/80   17 110/80     Pulse Readings from Last 3 Encounters:   18 76   18 84   17 77           Learning Assessment:  :     Learning Assessment 2018   PRIMARY LEARNER Patient Patient Patient Patient   HIGHEST LEVEL OF EDUCATION - PRIMARY LEARNER  4 YEARS OF COLLEGE 4 YEARS OF COLLEGE 4 YEARS OF COLLEGE 4 YEARS OF COLLEGE   BARRIERS PRIMARY LEARNER NONE NONE NONE NONE   CO-LEARNER CAREGIVER No No No No   PRIMARY LANGUAGE ENGLISH ENGLISH ENGLISH ENGLISH    NEED - - No No   LEARNER PREFERENCE PRIMARY READING READING READING DEMONSTRATION   LEARNING SPECIAL TOPICS - - no no   ANSWERED BY patient patient patient patient   RELATIONSHIP SELF SELF SELF SELF   ASSESSMENT COMMENT - - none none       Depression Screening:  :     PHQ over the last two weeks 2018   Little interest or pleasure in doing things Not at all   Feeling down, depressed, irritable, or hopeless Not at all   Total Score PHQ 2 0       Fall Risk Assessment:  :     No flowsheet data found. Abuse Screening:  :     Abuse Screening Questionnaire 2018   Do you ever feel afraid of your partner?  N N N N   Are you in a relationship with someone who physically or mentally threatens you? N N N N   Is it safe for you to go home? Y Y Y Y       Coordination of Care Questionnaire:  :     1) Have you been to an emergency room, urgent care clinic since your last visit? no   Hospitalized since your last visit? no             2) Have you seen or consulted any other health care providers outside of [address] [place] [address] since your last visit? no  (Include any pap smears or colon screenings in this section.)    3) Do you have an Advance Directive on file? no    4) Are you interested in receiving information on Advance Directives? NO      Patient is accompanied by self I have received verbal consent from [facility] to discuss any/all medical information while they are present in the room. Reviewed record  In preparation for visit and have obtained necessary documentation.

## 2018-11-21 NOTE — PATIENT INSTRUCTIONS
CBD oil vs Help oil    NOW or Nancy Kava Kava    Rescue Remedy Drops or pastilles      Calm Bryanna    10% Happier. Bryanna      Exercise 1:  The 4-7-8 (or Relaxing Breath) Exercise  This exercise is utterly simple, takes almost no time, requires no equipment and can be done anywhere. Although you can do the exercise in any position, sit with your back straight while learning the exercise. Place the tip of your tongue against the ridge of tissue just behind your upper front teeth, and keep it there through the entire exercise. You will be exhaling through your mouth around your tongue; try pursing your lips slightly if this seems awkward.  Exhale completely through your mouth, making a whoosh sound.  Close your mouth and inhale quietly through your nose to a mental count of four.  Hold your breath for a count of seven.  Exhale completely through your mouth, making a whoosh sound to a count of eight.  This is one breath. Now inhale again and repeat the cycle three more times for a total of four breaths. Note that you always inhale quietly through your nose and exhale audibly through your mouth. The tip of your tongue stays in position the whole time. Exhalation takes twice as long as inhalation. The absolute time you spend on each phase is not important; the ratio of 4:7:8 is important. If you have trouble holding your breath, speed the exercise up but keep to the ratio of 4:7:8 for the three phases. With practice you can slow it all down and get used to inhaling and exhaling more and more deeply. This exercise is a natural tranquilizer for the nervous system. Unlike tranquilizing drugs, which are often effective when you first take them but then lose their power over time, this exercise is subtle when you first try it but gains in power with repetition and practice. Do it at least twice a day. You cannot do it too frequently. Do not do more than four breaths at one time for the first month of practice.  Later, if you wish, you can extend it to eight breaths. If you feel a little lightheaded when you first breathe this way, do not be concerned; it will pass. Once you develop this technique by practicing it every day, it will be a very useful tool that you will always have with you. Use it whenever anything upsetting happens - before you react. Use it whenever you are aware of internal tension. Use it to help you fall asleep. This exercise cannot be recommended too highly. Everyone can benefit from it. Taken from Tanmay Bailey MD [facility] of [address] [employer] breathing exercises on [employer] and you will see a video       Instruction on how to use steam to improve congestion    Put two tbs of baking soda in a pot (quart) of water and heat to steam.  Take off fire and place face over steam with towel over your head and pot. Allow congestion to come out of nasal passages and then come out of towel to blow your nose. Return into the steam tent. It also will help more effectively to put a few drops of peppermint oil or eucalyptus in the mixture. They key is to allow everything stuck in your sinuses and nose to come out so it is not a medium for infection.     Ultrasonic humidifier

## 2018-11-21 NOTE — PROGRESS NOTES
HPI:  Dyan Owen is a 44y.o. year old male who is here for an annual physical:  Patient of Dr. Tim Kessler Readings from Last 3 Encounters:   11/21/18 170 lb 12.8 oz (77.5 kg)   07/16/18 174 lb 14.4 oz (79.3 kg)   07/24/17 181 lb 8 oz (82.3 kg)     Temp Readings from Last 3 Encounters:   11/21/18 97.9 °F (36.6 °C) (Oral)   07/16/18 98 °F (36.7 °C) (Oral)   07/24/17 97.3 °F (36.3 °C)     BP Readings from Last 3 Encounters:   11/21/18 120/70   07/16/18 138/80   07/24/17 110/80     Pulse Readings from Last 3 Encounters:   11/21/18 76   07/16/18 84   07/24/17 77        He reports the following history and medical concerns: On clonazepam.  Takes every night. Used to be on 2 mg. Now on 0.5 mg.  Used to have panic attacks at night when laying down. Internalize      During day he is fine. Assessment and Plan        1. Routine general medical examination at a health care facility  Well exam.   Pt needs to get blood test.  Does own testicular exam.      2. Adjustment disorder with anxiety  Use prn. Breathing techniques. Cut klonopin in half  Try kava- drops only  Pt doesn't drink alcohol    - clonazePAM (KLONOPIN) 0.5 mg tablet; Use 2 tablets or less at bedtime. Dispense: 50 Tab; Refill: 0             Historical Data    Vitals:    11/21/18 0845   BP: 120/70   Pulse: 76   Resp: 14   Temp: 97.9 °F (36.6 °C)   TempSrc: Oral   SpO2: 98%   Weight: 170 lb 12.8 oz (77.5 kg)   Height: 5' 10\" (1.778 m)         History reviewed. No pertinent past medical history. History reviewed. No pertinent surgical history. Outpatient Encounter Medications as of 11/21/2018   Medication Sig Dispense Refill    clonazePAM (KLONOPIN) 0.5 mg tablet Use 2 tablets or less at bedtime. 50 Tab 0    [DISCONTINUED] clonazePAM (KLONOPIN) 0.5 mg tablet Use 2 tablets or less at bedtime. 50 Tab 0     No facility-administered encounter medications on file as of 11/21/2018.          No Known Allergies     Social History Socioeconomic History    Marital status:      Spouse name: Not on file    Number of children: Not on file    Years of education: Not on file    Highest education level: Not on file   Social Needs    Financial resource strain: Not on file    Food insecurity - worry: Not on file    Food insecurity - inability: Not on file    Transportation needs - medical: Not on file   Mc[employer] needs - non-medical: Not on file   Occupational History    Not on file   Tobacco Use    Smoking status: Former Smoker     Packs/day: 0.25     Years: 15.00     Pack years: 3.75    Smokeless tobacco: Never Used   Substance and Sexual Activity    Alcohol use: No     Alcohol/week: 0.0 oz    Drug use: No    Sexual activity: Yes     Partners: Female   Other Topics Concern    Not on file   Social History Narrative    Not on file        family history includes Heart Disease (age of onset: 36) in his father; MS in his sister. Review of Systems   Constitutional: Negative for chills, diaphoresis, fever, malaise/fatigue and weight loss. HENT: Positive for congestion. Negative for hearing loss. Eyes: Negative for double vision. Respiratory: Negative for cough and shortness of breath. Cardiovascular: Negative for chest pain. Gastrointestinal: Negative for blood in stool and constipation. Genitourinary: Negative for dysuria, flank pain, frequency and urgency. Musculoskeletal: Negative for myalgias. Skin: Negative for rash. Neurological: Negative for dizziness, tingling, sensory change, weakness and headaches. Endo/Heme/Allergies: Does not bruise/bleed easily. Psychiatric/Behavioral: Negative for depression and memory loss. The patient is nervous/anxious. The patient does not have insomnia.           Visit Vitals  /70 (BP 1 Location: Left arm, BP Patient Position: Sitting)   Pulse 76   Temp 97.9 °F (36.6 °C) (Oral)   Resp 14   Ht 5' 10\" (1.778 m)   Wt 170 lb 12.8 oz (77.5 kg)   SpO2 98%   BMI 24.51 kg/m²         Physical Exam   Constitutional: He is oriented to person, place, and time and well-developed, well-nourished, and in no distress. No distress. HENT:   Right Ear: External ear normal.   Left Ear: External ear normal.   Nose: Nose normal.   Mouth/Throat: Oropharyngeal exudate present. Eyes: Conjunctivae and EOM are normal. Left eye exhibits no discharge. Neck: Normal range of motion. Neck supple. No thyromegaly present. Cardiovascular: Normal rate, regular rhythm and normal heart sounds. Exam reveals no friction rub. No murmur heard. Pulmonary/Chest: Effort normal and breath sounds normal. No respiratory distress. He has no wheezes. He has no rales. Abdominal: Soft. Bowel sounds are normal. He exhibits no distension. There is no tenderness. Musculoskeletal: Normal range of motion. He exhibits no edema, tenderness or deformity. Lymphadenopathy:     He has no cervical adenopathy. Neurological: He is alert and oriented to person, place, and time. He exhibits normal muscle tone. Coordination normal.   Skin: Skin is warm and dry. No rash noted. No erythema. Psychiatric: Mood and affect normal.   Vitals reviewed. Refused testicular exam.  Pt does on own      Ortho Exam     Assessment:  See Above for discussion/plan. Annual Physical completed. Counseling and risk factor reduction topics were discussed such as relaxation techniques. Alternatives to klonopin. Get pill cutter. exercise      I have reviewed the patient's medical history in detail and updated the computerized patient record. We had a prolonged discussion about these complex clinical issues and went over the various important aspects to consider. All questions were answered.      Advised him to call back or return to office if symptoms do not improve, change in nature, or persist.    He was given an after visit summary or informed of [employer] [facility] which includes patient instructions, diagnoses, current medications, & vitals. He expressed understanding with the diagnosis and plan.

## 2019-01-30 DIAGNOSIS — F43.22 ADJUSTMENT DISORDER WITH ANXIETY: ICD-10-CM

## 2019-01-30 RX ORDER — CLONAZEPAM 0.5 MG/1
TABLET ORAL
Qty: 50 TAB | Refills: 0 | Status: SHIPPED | OUTPATIENT
Start: 2019-01-30 | End: 2019-03-08 | Stop reason: SDUPTHER

## 2019-01-30 NOTE — TELEPHONE ENCOUNTER
Requested Prescriptions     Pending Prescriptions Disp Refills    clonazePAM (KLONOPIN) 0.5 mg tablet 50 Tab 0     Sig: Use 2 tablets or less at bedtime.      11/21/2018  No upcoming  [facility]ger on file

## 2019-01-30 NOTE — TELEPHONE ENCOUNTER
checked as delegate on 1/30/19  Last fill on clonazepam was 11/26/2018  # 50 for a 25 day supply       Left voicemail message for patient advising that he needs to be looking for a new pcp outside of the office

## 2019-03-08 DIAGNOSIS — F43.22 ADJUSTMENT DISORDER WITH ANXIETY: ICD-10-CM

## 2019-03-11 RX ORDER — CLONAZEPAM 0.5 MG/1
TABLET ORAL
Qty: 50 TAB | Refills: 0 | OUTPATIENT
Start: 2019-03-11

## 2019-03-11 NOTE — TELEPHONE ENCOUNTER
Last office visit 11/21/18 (CPE)  No upcoming appointments      checked as delegate on 3/11/19  Last fill on clonazepam was 01/31/2019  $ 50 for a 25 day supply

## 2019-03-30 ENCOUNTER — HOSPITAL ENCOUNTER (EMERGENCY)
Dept: HOSPITAL 14 - H.ER | Age: 44
Discharge: HOME | End: 2019-03-30
Payer: COMMERCIAL

## 2019-03-30 VITALS
SYSTOLIC BLOOD PRESSURE: 128 MMHG | TEMPERATURE: 97.5 F | HEART RATE: 72 BPM | RESPIRATION RATE: 16 BRPM | DIASTOLIC BLOOD PRESSURE: 75 MMHG

## 2019-03-30 VITALS — BODY MASS INDEX: 25.1 KG/M2

## 2019-03-30 VITALS — OXYGEN SATURATION: 100 %

## 2019-03-30 DIAGNOSIS — M25.531: Primary | ICD-10-CM

## 2019-03-30 DIAGNOSIS — Z88.8: ICD-10-CM

## 2019-03-30 DIAGNOSIS — G56.00: ICD-10-CM

## 2019-03-30 LAB
BASOPHILS # BLD AUTO: 0 K/UL (ref 0–0.2)
BASOPHILS NFR BLD: 0.6 % (ref 0–2)
BUN SERPL-MCNC: 18 MG/DL (ref 9–20)
CALCIUM SERPL-MCNC: 8.9 MG/DL (ref 8.4–10.2)
EOSINOPHIL # BLD AUTO: 0 K/UL (ref 0–0.7)
EOSINOPHIL NFR BLD: 0.2 % (ref 0–4)
ERYTHROCYTE [DISTWIDTH] IN BLOOD BY AUTOMATED COUNT: 14.2 % (ref 11.5–14.5)
ERYTHROCYTE [SEDIMENTATION RATE] IN BLOOD: 8 MM/HR (ref 0–15)
GFR NON-AFRICAN AMERICAN: > 60
HGB BLD-MCNC: 13.2 G/DL (ref 12–18)
LYMPHOCYTES # BLD AUTO: 1.3 K/UL (ref 1–4.3)
LYMPHOCYTES NFR BLD AUTO: 17.2 % (ref 20–40)
MCH RBC QN AUTO: 30.4 PG (ref 27–31)
MCHC RBC AUTO-ENTMCNC: 34.2 G/DL (ref 33–37)
MCV RBC AUTO: 88.9 FL (ref 80–94)
MONOCYTES # BLD: 0.8 K/UL (ref 0–0.8)
MONOCYTES NFR BLD: 10.4 % (ref 0–10)
NEUTROPHILS # BLD: 5.4 K/UL (ref 1.8–7)
NEUTROPHILS NFR BLD AUTO: 71.6 % (ref 50–75)
NRBC BLD AUTO-RTO: 0 % (ref 0–0)
PLATELET # BLD: 153 K/UL (ref 130–400)
PMV BLD AUTO: 8.6 FL (ref 7.2–11.7)
RBC # BLD AUTO: 4.36 MIL/UL (ref 4.4–5.9)
WBC # BLD AUTO: 7.5 K/UL (ref 4.8–10.8)

## 2019-03-30 NOTE — ED PDOC
Upper Extremity Pain/Injury


Time Seen by Provider: 03/30/19 10:45


Chief Complaint (Nursing): Upper Extremity Problem/Injury


Chief Complaint (Provider): RIGHT WRIST PAIN AND SWELLING 


History Per: Patient


History/Exam Limitations: no limitations


Onset/Duration Of Symptoms: Days


Current Symptoms Are (Timing): Still Present


Quality: Aching


Severity: Moderate


Pain Scale Rating Of: 6


Exacerbating Factor(s): Movement


Additional History Per: Patient


Additional Complaint(s): 


44 Y/O MALE WITH NO SIGNIFICANT MEDICAL HX PRESENTS WITH RIGHT WRIST PAIN SINCE 

YESTERDAY. PT REPORTS WRIST "SORENESS" YESTERDAY  FOR WHICH HE TOOK ADVIL WITH 

SOME RELIEF. UPON RISING THIS AM, WRIST PAIN WAS WORSE WITH SWELLING WHICH 

PROMPTED ED VISIT. PT DENIES INJURY HOWEVER HE REGULARLY DOES [employer], LAST CLASS WAS ON WEDNESDAY. DENIES, FEVER, CHILLS, BODYACHES AND REDNESS

TO JOINT. 








Past Medical History


Reviewed: Historical Data, Nursing Documentation, Vital Signs


Vital Signs: 





                                Last Vital Signs











Temp  98.4 F   03/30/19 10:28


 


Pulse  56 L  03/30/19 10:28


 


Resp  17   03/30/19 10:28


 


BP  145/85   03/30/19 10:28


 


Pulse Ox  100   03/30/19 10:28














- Medical History


PMH: No Chronic Diseases





- Surgical History


Surgical History: Appendectomy





- Family History


Family History: States: Unknown Family Hx





- Social History


Alcohol: None


Drugs: Denies





- Immunization History


Hx Tetanus Toxoid Vaccination: Yes


Hx Influenza Vaccination: No


Hx Pneumococcal Vaccination: No





- Home Medications


Home Medications: 


                                Ambulatory Orders











 Medication  Instructions  Recorded


 


Ibuprofen [Motrin Tab] 800 mg PO Q6H PRN #30 tab 03/30/19


 


Tramadol HCl [Ultram] 50 mg PO Q6H PRN #8 tablet 03/30/19














- Allergies


Allergies/Adverse Reactions: 


                                    Allergies











Allergy/AdvReac Type Severity Reaction Status Date / Time


 


Iodinated Contrast- Oral and Allergy  URTICARIA Verified 03/30/19 10:44





IV Dye     














Review of Systems


ROS Statement: Except As Marked, All Systems Reviewed And Found Negative


Constitutional: Negative for: Fever, Chills, Sweats, Weakness, Malaise


Eyes: Negative for: Pain, Vision Change, Conjunctivae Inflammation, Eyelid 

Inflammation, Redness


ENT: Negative for: Ear Pain, Mouth Swelling, Throat Swelling


Cardiovascular: Negative for: Chest Pain, Palpitations


Respiratory: Negative for: Cough, Shortness of Breath


Gastrointestinal: Negative for: Nausea, Vomiting, Abdominal Pain, Constipation


Genitourinary Male: Negative for: Dysuria


Musculoskeletal: Positive for: Other (WRIST PAIN AND SWELLING )


Neurological: Negative for: Weakness





Physical Exam





- Reviewed


Nursing Documentation Reviewed: Yes


Vital Signs Reviewed: Yes





- Physical Exam


Appears: Positive for: Well, Non-toxic, No Acute Distress


Head Exam: Positive for: ATRAUMATIC, NORMAL INSPECTION, NORMOCEPHALIC


Skin: Positive for: Normal Color, Warm, Dry


Eye Exam: Positive for: EOMI, Normal appearance, PERRL


ENT: Positive for: Normal ENT Inspection


Neck: Positive for: Normal, Painless ROM


Cardiovascular/Chest: Positive for: Regular Rate, Rhythm


Respiratory: Positive for: CNT, Normal Breath Sounds


Pulses-Radial (L): 2+


Pulses-Radial (R): 2+


Gastrointestinal/Abdominal: Positive for: Normal Exam, Soft


Back: Positive for: Normal Inspection


Extremity: Positive for: Tenderness (RIGHT WRIST TENDERNESS OVER ULNAR HEAD, POS

 FOR JOINT SWELLING, LIMITED ROM DUE TO PAIN, PAIN WORSE WITH FLEXTION, SKIN IS 

INTACT, WARM AND COLOR WNL ), Capillary Refill, Swelling.  Negative for: 

Deformity (WRIST)


Neurological/Psych: Positive for: Awake, Alert, Normal Tone, Oriented





- Laboratory Results


Result Diagrams: 


                                 03/30/19 13:40





                                 03/30/19 13:40





- ECG


O2 Sat by Pulse Oximetry: 100





- Progress


ED Course And Treament: 


RIGHT WROST XRAY 


MOTRIN





CBC


BMP


ESR


MRI OF WRIST W/O CONTRAST








12:25 PT RE-EVALUATED AT THIS TIME, NO ACUTE FX ON XRAY.CASE DISCUSSED WITH DR. HUGHES, ATTENDING EXAMINED PT AT BEDSIDE. FURTHER WOR-UP NEEDED.


1500: MRI RESULTS AND LABS REVIEWED WITH DR. HUGHES. RADIOLOGIST READ, 

IMPRESSION: PT HAS SCAPHOLUNATE LIGAMENT TEAR AND JOINT EFFUSIONS OF THE WRIST. 

PT PLACED IN VOLAR SPLINT BY PCT, NUERO/VASCU EXAM PERFORMED BY ME POST 

SPLINTING (INTACT). PT GIVEN REFERRAL TO ORTHO AND HAND FOR FOLLOW UP IN 1-2 

WEEKS. RX GIVEN FOR MOTRIN 800MG PO Q6H HRS FOR PAIN AND TRAMADOL 50 MG PO FOR 

BREAKTHROUGH PAIN. TP VERBALIZES UNDERSTANDING, PT GIVEN RETURN TO ED 

PRECAUTIONS. 


Re-evaluation Time: 15:00


Condition: Re-examined





Disposition





- Clinical Impression


Clinical Impression: 


 Wrist joint pain, Right scapholunate ligament tear








- Patient ED Disposition


Is Patient to be Admitted: No


Counseled Patient/Family Regarding: Diagnosis, Need For Followup, Rx Given





- Disposition


Referrals: 


Sidney Blanc MD [Medical Doctor] - 


Terell Pineda III, MD [Staff Provider] - 


Disposition Time: 15:00


Condition: IMPROVED


Additional Instructions: 


FOLLOW-UP WITH ORTHO IN 1-2 WEEKS 


Prescriptions: 


Ibuprofen [Motrin Tab] 800 mg PO Q6H PRN #30 tab


 PRN Reason: Pain, Moderate (4-7)


Tramadol HCl [Ultram] 50 mg PO Q6H PRN #8 tablet


 PRN Reason: Pain, Severe (8-10)


Instructions:  Joint Pain


Print Language: ENGLISH





- POA


Present On Arrival: None

## 2019-03-30 NOTE — RAD
Date of service: 



03/30/2019



PROCEDURE:  Right Wrist Radiographs.







HISTORY:

PAIN/SWELLING



COMPARISON:

None.



TECHNIQUE:

4 views obtained.



FINDINGS:



BONES:

Normal. No fracture.



JOINTS:

Normal. No dislocation. 



SOFT TISSUES:

Normal. 



OTHER FINDINGS:

None.



IMPRESSION:

No evidence of acute displaced fracture nor dislocation. 



If symptoms persist or occult fracture suspected clinically recommend 

repeat radiographs in 7-10 days as most fractures should become 

radiographically evident in this timeframe.

## 2019-04-01 NOTE — MRI
MRI right wrist pain and swelling. 



COMPARISON:

None available. 



Technique: Multi-echo multiplanar sequences were performed through 

the right wrist without the use of intravenous contrast. 



Findings: 



Large amount of fluid seen within the radiocarpal, pisotriquetral, 

and radial ulnar joint spaces. This is of uncertain clinical etiology 

and may be the sequelae of posttraumatic change versus acute 

inflammatory and or infectious changes and or additional etiology. 

Clinical correlation. 



Fraying with increased signal seen within the midsubstance of the 

scapholunate lunate ligament suggestive for partial tearing. 



Lunatotriquetral ligament appears preserved. 



Fraying with increased signal seen within the ulnar attachment of the 

triangular fibrocartilage suggestive for partial tearing. 



3.8 millimeter subchondral cyst formation seen within the proximal 

pole of the capitate bone as well as additional 4.8 millimeter 

subchondral cyst formation and or intraosseous ganglia formation seen 

at the ulnar aspect of the distal radius. 



Some patchy decreased T1 signal with increased STIR signal seen 

within the dorsal aspects of the proximal pole of the scaphoid as 

well as the radial aspect of the lunate bone which may represent bone 

bruising versus subchondral osseous injury versus sequelae of 

osteochondral change versus additional etiology. 



Degenerative changes noted at the 1st carpometacarpal joint space. In 

addition, globular signal abnormality seen at the base of the 1st 

metacarpal bone at its radial aspect measuring 5 millimeters 

demonstrating patchy decreased T1 signal and increased STIR signal 

suggestive for possible subchondral cyst formation with osteochondral 

change versus bone bruising versus subchondral osseous injury versus 

additional etiology. Clinical correlation. 



Visualized extensor tendons are preserved. 



Mild bowing of the flexor retinaculum at the level of the carpal 

tunnel suggestive for a mild carpal tunnel syndrome. 



Impression: 



1. Large amount of fluid seen within the radiocarpal, pisotriquetral, 

and radial ulnar joint spaces. This is of uncertain clinical etiology 

and may be the sequelae of posttraumatic change versus acute 

inflammatory and or infectious changes and or additional etiology. 

Clinical correlation. 



2. Fraying with increased signal seen within the midsubstance of the 

scapholunate lunate ligament suggestive for partial tearing. 



3. Fraying with increased signal seen within the ulnar attachment of 

the triangular fibrocartilage suggestive for partial tearing. 



4. 3.8 millimeter subchondral cyst formation seen within the proximal 

pole of the capitate bone as well as additional 4.8 millimeter 

subchondral cyst formation and or intraosseous ganglia formation seen 

at the ulnar aspect of the distal radius. 



5. Some patchy decreased T1 signal with increased STIR signal seen 

within the dorsal aspects of the proximal pole of the scaphoid as 

well as the radial aspect of the lunate bone which may represent bone 

bruising versus subchondral osseous injury versus sequelae of 

osteochondral change versus additional etiology. 



6. Degenerative changes noted at the 1st carpometacarpal joint space. 

In addition, globular signal abnormality seen at the base of the 1st 

metacarpal bone at its radial aspect measuring 5 millimeters 

demonstrating patchy decreased T1 signal and increased STIR signal 

suggestive for possible subchondral cyst formation with osteochondral 

change versus bone bruising versus subchondral osseous injury versus 

additional etiology. Clinical correlation. 



7. Mild bowing of the flexor retinaculum at the level of the carpal 

tunnel suggestive for a mild carpal tunnel syndrome. 



A preliminary report was generated at 1:44 p.m. on 03/30/2019 by Dr. Juni Kincaid from [facility]. 



This case was placed in the PA review folder.